# Patient Record
Sex: FEMALE | Race: WHITE
[De-identification: names, ages, dates, MRNs, and addresses within clinical notes are randomized per-mention and may not be internally consistent; named-entity substitution may affect disease eponyms.]

---

## 2017-12-04 ENCOUNTER — HOSPITAL ENCOUNTER (OUTPATIENT)
Dept: HOSPITAL 45 - C.MTU | Age: 37
Discharge: HOME | End: 2017-12-04
Attending: FAMILY MEDICINE
Payer: COMMERCIAL

## 2017-12-04 VITALS
WEIGHT: 130.07 LBS | BODY MASS INDEX: 22.21 KG/M2 | BODY MASS INDEX: 22.21 KG/M2 | HEIGHT: 64.02 IN | HEIGHT: 64.02 IN | WEIGHT: 130.07 LBS

## 2017-12-04 VITALS
TEMPERATURE: 98.78 F | DIASTOLIC BLOOD PRESSURE: 86 MMHG | HEART RATE: 70 BPM | OXYGEN SATURATION: 98 % | SYSTOLIC BLOOD PRESSURE: 122 MMHG

## 2017-12-04 DIAGNOSIS — M32.9: Primary | ICD-10-CM

## 2017-12-04 DIAGNOSIS — R68.89: ICD-10-CM

## 2017-12-04 LAB
ALBUMIN/GLOB SERPL: 0.9 {RATIO} (ref 0.9–2)
ALP SERPL-CCNC: 96 U/L (ref 45–117)
ALT SERPL-CCNC: 40 U/L (ref 12–78)
ANION GAP SERPL CALC-SCNC: 9 MMOL/L (ref 3–11)
APPEARANCE UR: CLEAR
AST SERPL-CCNC: (no result) U/L (ref 15–37)
AST SERPL-CCNC: 19 U/L (ref 15–37)
BILIRUB UR-MCNC: (no result) MG/DL
BUN SERPL-MCNC: 12 MG/DL (ref 7–18)
BUN/CREAT SERPL: 12.9 (ref 10–20)
CALCIUM SERPL-MCNC: 8.5 MG/DL (ref 8.5–10.1)
CHLORIDE SERPL-SCNC: 105 MMOL/L (ref 98–107)
CO2 SERPL-SCNC: 24 MMOL/L (ref 21–32)
COLOR UR: YELLOW
CREAT CL PREDICTED SERPL C-G-VRATE: 73.8 ML/MIN
CREAT SERPL-MCNC: 0.91 MG/DL (ref 0.6–1.2)
EOSINOPHIL NFR BLD AUTO: 120 K/UL (ref 130–400)
GLOBULIN SER-MCNC: 4.5 GM/DL (ref 2.5–4)
GLUCOSE SERPL-MCNC: 79 MG/DL (ref 70–99)
HCT VFR BLD CALC: 42.9 % (ref 37–47)
MANUAL MICROSCOPIC REQUIRED?: NO
MCH RBC QN AUTO: 29.6 PG (ref 25–34)
MCHC RBC AUTO-ENTMCNC: 33.3 G/DL (ref 32–36)
MCV RBC AUTO: 88.8 FL (ref 80–100)
NITRITE UR QL STRIP: (no result)
PH UR STRIP: 6.5 [PH] (ref 4.5–7.5)
PMV BLD AUTO: 10.2 FL (ref 7.4–10.4)
POTASSIUM SERPL-SCNC: (no result) MMOL/L (ref 3.5–5.1)
POTASSIUM SERPL-SCNC: 3.6 MMOL/L (ref 3.5–5.1)
RBC # BLD AUTO: 4.83 M/UL (ref 4.2–5.4)
REVIEW REQ?: YES
SODIUM SERPL-SCNC: 137 MMOL/L (ref 136–145)
SP GR UR STRIP: 1.01 (ref 1–1.03)
URINE EPITHELIAL CELL AUTO: >30 /LPF (ref 0–5)
UROBILINOGEN UR-MCNC: (no result) MG/DL
WBC # BLD AUTO: 4.03 K/UL (ref 4.8–10.8)

## 2017-12-04 RX ADMIN — SODIUM CHLORIDE SCH MLS/HR: 900 INJECTION, SOLUTION INTRAVENOUS at 14:05

## 2018-01-10 ENCOUNTER — HOSPITAL ENCOUNTER (OUTPATIENT)
Dept: HOSPITAL 45 - C.MED | Age: 38
Setting detail: OBSERVATION
LOS: 1 days | Discharge: HOME | End: 2018-01-11
Attending: FAMILY MEDICINE | Admitting: HOSPITALIST
Payer: COMMERCIAL

## 2018-01-10 VITALS
HEART RATE: 79 BPM | DIASTOLIC BLOOD PRESSURE: 75 MMHG | SYSTOLIC BLOOD PRESSURE: 115 MMHG | OXYGEN SATURATION: 96 % | TEMPERATURE: 98.42 F

## 2018-01-10 VITALS
OXYGEN SATURATION: 97 % | HEART RATE: 69 BPM | SYSTOLIC BLOOD PRESSURE: 137 MMHG | TEMPERATURE: 97.7 F | DIASTOLIC BLOOD PRESSURE: 85 MMHG

## 2018-01-10 VITALS — OXYGEN SATURATION: 98 %

## 2018-01-10 VITALS
WEIGHT: 131.18 LBS | WEIGHT: 131.18 LBS | HEIGHT: 64 IN | BODY MASS INDEX: 22.39 KG/M2 | BODY MASS INDEX: 22.39 KG/M2 | HEIGHT: 64 IN

## 2018-01-10 VITALS
DIASTOLIC BLOOD PRESSURE: 80 MMHG | OXYGEN SATURATION: 98 % | SYSTOLIC BLOOD PRESSURE: 121 MMHG | TEMPERATURE: 98.78 F | HEART RATE: 87 BPM

## 2018-01-10 DIAGNOSIS — I73.00: ICD-10-CM

## 2018-01-10 DIAGNOSIS — G40.909: ICD-10-CM

## 2018-01-10 DIAGNOSIS — F33.9: ICD-10-CM

## 2018-01-10 DIAGNOSIS — F17.210: ICD-10-CM

## 2018-01-10 DIAGNOSIS — M32.14: Primary | ICD-10-CM

## 2018-01-10 DIAGNOSIS — N80.9: ICD-10-CM

## 2018-01-10 DIAGNOSIS — L70.9: ICD-10-CM

## 2018-01-10 DIAGNOSIS — Z79.899: ICD-10-CM

## 2018-01-10 DIAGNOSIS — G43.909: ICD-10-CM

## 2018-01-10 LAB
BASOPHILS # BLD: 0.03 K/UL (ref 0–0.2)
BASOPHILS NFR BLD: 0.4 %
BUN SERPL-MCNC: 17 MG/DL (ref 7–18)
CALCIUM SERPL-MCNC: 8.7 MG/DL (ref 8.5–10.1)
CO2 SERPL-SCNC: 30 MMOL/L (ref 21–32)
CREAT SERPL-MCNC: 0.81 MG/DL (ref 0.6–1.2)
EOS ABS #: 0.03 K/UL (ref 0–0.5)
EOSINOPHIL NFR BLD AUTO: 207 K/UL (ref 130–400)
GLUCOSE SERPL-MCNC: 88 MG/DL (ref 70–99)
HCT VFR BLD CALC: 35.6 % (ref 37–47)
HGB BLD-MCNC: 12 G/DL (ref 12–16)
IG#: 0.15 K/UL (ref 0–0.02)
IMM GRANULOCYTES NFR BLD AUTO: 20.1 %
INR PPP: 1 (ref 0.9–1.1)
LYMPHOCYTES # BLD: 1.47 K/UL (ref 1.2–3.4)
MCH RBC QN AUTO: 31.1 PG (ref 25–34)
MCHC RBC AUTO-ENTMCNC: 33.7 G/DL (ref 32–36)
MCV RBC AUTO: 92.2 FL (ref 80–100)
MONO ABS #: 0.63 K/UL (ref 0.11–0.59)
MONOCYTES NFR BLD: 8.6 %
NEUT ABS #: 5.02 K/UL (ref 1.4–6.5)
NEUTROPHILS # BLD AUTO: 0.4 %
NEUTROPHILS NFR BLD AUTO: 68.5 %
PMV BLD AUTO: 8.5 FL (ref 7.4–10.4)
POTASSIUM SERPL-SCNC: 3.7 MMOL/L (ref 3.5–5.1)
PTT PATIENT: 22.8 SECONDS (ref 21–31)
RED CELL DISTRIBUTION WIDTH CV: 14.5 % (ref 11.5–14.5)
RED CELL DISTRIBUTION WIDTH SD: 49.3 FL (ref 36.4–46.3)
SODIUM SERPL-SCNC: 140 MMOL/L (ref 136–145)
WBC # BLD AUTO: 7.33 K/UL (ref 4.8–10.8)

## 2018-01-10 RX ADMIN — CEPHALEXIN SCH MG: 500 CAPSULE ORAL at 20:00

## 2018-01-10 RX ADMIN — LORAZEPAM PRN MG: 0.5 TABLET ORAL at 23:02

## 2018-01-10 NOTE — HISTORY AND PHYSICAL
History & Physical


Date & Time of Service:


Андрей 10, 2018 at 14:04


Chief Complaint:


Lupus Flare


Primary Care Physician:


Bhargavi Mallory D.O.


History of Present Illness


38 y/o F with h/o SLE presents as direct admit from PCP office for management 

of exacerbation of lupus failing outpatient treatment. During her routine labs 

follow up labs for lupus about a month ago she was noted to have proteinuria 

with 1gm of protein in the urine. She also reported increase in the baseline 

fatigue. She was put on oral prednisone. While on treatment she also developed 

severe acne and seeked treatment due to lymph node swelling in the neck as a 

results of severe acne and was prescribed keflex a week ago. After starting 

keflex she noted improvement in acne and fatigue. She was called by Dr. Davis's 

office today for having worsening proteinuria of 6gms on her repeat labs and so 

is being admitted for aggressive management.





Past Medical/Surgical History


Medical Problems:


(1) Acne


Status: Chronic  





(2) Endometriosis


Status: Chronic  





(3) Hypermobility of joint


Status: Chronic  





(4) Major depressive disorder, recurrent episode with anxious distress


Status: Chronic  





(5) Migraine


Status: Chronic  





(6) Seizure


Status: Chronic  





(7) Systemic lupus erythematosus (SLE) in adult


Status: Chronic  





Surgical Problems:


(1) H/O laparoscopy


Status: Resolved  





(2) S/P PICC central line placement


Status: Resolved  





(3) [place] teeth removed


Status: Resolved  








Family History





Patient reports no known family medical history.


CAD


Diabetes


Epilepsy


GI bleed


HTN


Lupus


Stroke





Social History


Smoking Status:  Light Tobacco Smoker (smoked for 15 yrs. Has smoked 1ppd until 

recent past)


Drug Use:  none


Marital Status:  single


Housing status:  lives with family


Occupational Status:  unemployed





Multi-Drug Resistant Organisms


History of MDRO:  No





Allergies


Coded Allergies:  


     Filgrastim (Unverified  Allergy, Intermediate, RASH, 12/4/17)


     BEE STING (Verified  Allergy, Unknown, swelling, 12/4/17)


     Latex1 -Allergic Contact Dermititis (Verified  Allergy, Unknown, rash, 1/10

/18)





Home Medications


Scheduled


Levonorgestrel (Iud) (Mirena), 20 MCG INT UTER UD


Prednisone (Prednisone), 2 TAB PO DAILY


[Cephalex ], 1,000 MG OR DAILY


[oxycontin], 5 MG PO DAILY





Scheduled PRN


Lorazepam (Ativan), 0.5 MG PO TID PRN for Anxiety/Agitation





Review of Systems


Constitutional:  + fatigue, No fever


Eyes:  No worsening of vision


ENT:  No hearing loss


Respiratory:  No cough, No sputum, No wheezing


Cardiovascular:  No chest pain


Abdomen:  No pain, No nausea, No vomiting


Musculoskeletal:  + joint pain (chronic joint pain in all joint especially in 

hand joints)


Genitourinary - Female:  No dysuria


Neurologic:  No memory loss, No paralysis, No weakness, No numbness/tingling


Psychiatric:  + depression symptoms, + anxiety


Endocrine:  + fatigue


Hematologic / Lymphatic:  No abnormal bleeding/bruising


Integumentary:  + rash (acne)


Allergic / Immunologic:  No seasonal allergies





Physical Exam


Vital Signs











  Date Time  Temp Pulse Resp B/P (MAP) Pulse Ox O2 Delivery O2 Flow Rate FiO2


 


1/10/18 13:11 37.1 87 16 121/80 98 Room Air  








General Appearance:  no apparent distress


Head:  normocephalic, atraumatic


Eyes:  normal inspection


ENT:  hearing grossly normal, TMs normal, pharynx normal, + pertinent finding (

tongue coated)


Neck:  supple, thyroid normal, + pertinent finding (right soft cervical 

adenopathy)


Respiratory/Chest:  lungs clear, no respiratory distress


Cardiovascular:  regular rate, rhythm, no edema, no gallop, no JVD, no murmur, 

normal peripheral pulses


Abdomen/GI:  normal bowel sounds, non tender, soft


Back:  normal inspection


Extremities/Musculoskelatal:  normal inspection, no calf tenderness, normal 

capillary refill, no pedal edema


Neurologic/Psych:  no motor/sensory deficits, alert, normal mood/affect, normal 

reflexes, oriented x 3


Skin:  + pertinent finding (extensive facial acne)





Diagnostics


Laboratory Results


Pending





Impression


Assessment and Plan


38 y/o F with h/o Lupus here with exacerbation of SLE manifesting as worsening 

proteinuria





SLE flare failed outpatient treatment 


- spoke to Dr. Cage. switch prednisone to 1gm solumedrol daily x 3 days. PPI 

for GI prophylaxis


- consult rheumatology 


- cbc, bmp, pt/inr


- continue prn oxycodone for joint pain





Depression/Anxiety


- Was recently put on prozac and had severe headache so stopped it. 


- take prn lorazepam - once or twice a month. 


- Since going on high dose steroid - closely monitor. 





Acne 


- continue keflex





? h/o Seizure


- Not on medication. Follow. 





Encourage ambulation


Full code. 


PPI for GI proph considering steroid dose





Advanced Directives


Existing Living Will:  No


Existing Power of :  No





VTE Prophylaxis


VTE Risk Assessment Done? Y/N:  Yes


Risk Level:  Low

## 2018-01-11 VITALS
HEART RATE: 85 BPM | DIASTOLIC BLOOD PRESSURE: 78 MMHG | OXYGEN SATURATION: 93 % | TEMPERATURE: 98.96 F | SYSTOLIC BLOOD PRESSURE: 125 MMHG

## 2018-01-11 VITALS
OXYGEN SATURATION: 98 % | TEMPERATURE: 98.6 F | SYSTOLIC BLOOD PRESSURE: 129 MMHG | HEART RATE: 63 BPM | DIASTOLIC BLOOD PRESSURE: 83 MMHG

## 2018-01-11 VITALS
DIASTOLIC BLOOD PRESSURE: 83 MMHG | TEMPERATURE: 98.6 F | HEART RATE: 63 BPM | OXYGEN SATURATION: 98 % | SYSTOLIC BLOOD PRESSURE: 129 MMHG

## 2018-01-11 VITALS
TEMPERATURE: 98.6 F | OXYGEN SATURATION: 98 % | DIASTOLIC BLOOD PRESSURE: 77 MMHG | SYSTOLIC BLOOD PRESSURE: 115 MMHG | HEART RATE: 67 BPM

## 2018-01-11 RX ADMIN — CEPHALEXIN SCH MG: 500 CAPSULE ORAL at 08:17

## 2018-01-11 RX ADMIN — LORAZEPAM PRN MG: 0.5 TABLET ORAL at 10:37

## 2018-01-11 NOTE — RHEUMATOLOGY CONSULTATION
Rheumatology Consultation


Date of Consultation:


2018.


Requesting Physician:


Dr Lizama


Attending Physician:


Dr Lizama


Reason for Consultation:


SLE nephritis flare


History of Present Illness


Brandon is a 37-year-old female with known systemic lupus since  with 

previous flares that have responded favorably to steroids.  The patient is 

known to me from previous hospitalization  for lupus nephritis flare felt 

to be brought on by Bactrim.  At that time her anti-DNA was greater than 3000 

in a discussion of a kidney biopsy was done but at that point decided to just 

treat with steroids at the patient 's request and no biopsy was performed.  She 

has not been seen by our Rheumatology Department since 2016. Her 

original diagnosis was in [place] around  where she initially 

presented with rash, fatigue and inflammatory arthritis.  She also had a 

hospitalization several years later that was pretty significant that included 

inflammatory arthritis, hepatitis and neutropenia.  In the past she has been 

treated with Plaquenil but did not tolerate it from headaches and diarrhea.  

She reports she took for maybe 2 years.  She has also received methotrexate in 

the past.  Unfortunately even though she signed request for old records her 

[place] records have never been sent to her doctors in the [facility].  Since  she is mainly treated her systemic lupus flares with 

steroids.  Her flares of consisted of arthralgias, fatigue and has had lupus 

nephritis flares as well.  She reports that she did see a rheumatologist and 

[place] at [facility] in the last year but could no longer go cause a insurance 

reasons.  At that time her lab work looks good and she was not placed on any 

kind of therapy.  She does not recall how long she has been off prednisone 

since her last visit with me but probably was about a year.  She reports that 

started around [place] she started feeling ill but thought was related to 

the flu because multiple family members were sick but she just did not get 

better.  Her symptoms were mainly arthralgias myalgias and fatigue.  She 

followed with her PCP and with lab work was noted to have some protein in her 

urine.  She saw Dr Cage in mid December (Dr Cage had followed her past well) 

and he noted on studies that she had about 800 milligrams of protein but other 

labs look okay.  She had been placed on 40 milligrams of prednisone with a good 

response with start to feel better.  Lab work done recently though showed 5.6 

grams of protein in her urine but her GFR was normal.  She had a slightly low 

C3 but normal C4 in her anti DNA was 20. She was admitted to the hospital for 

high dose IV steroids for lupus nephritis flare.  She received her 1st dose 

yesterday and will be getting her 2nd dose today.  She reports she is feeling 

much better.  She also notes during the time over the last several weeks she 

developed a facial rash that similar to what she has had in the past.  It 

almost seems like acne weather can be some pustules both also areas the feel 

cystic.  She was placed on Keflex for secondary skin infection as well since 

she was on high-dose steroids.  She also noted some right-sided lymphadenopathy 

that led to the antibiotic use.  She feels the rash is getting better.  She is 

not really having any musculokeletal complaints today.  She is hoping to go 

home today or tomorrow after her IV steroid courses are complete.  Dr. Cage's 

note was reviewed.  She did ask me about the need for a biopsy could she is 

considering this at this point could she does not want to be on high-dose 

steroids for long periods of time.  She mentions that a close friend of hers 

mother  from complications of systemic lupus and chronic steroid use and 

she does not want that to be her.  She reports that she has dealt with some 

depression as well and recently was put on Paxil to help with this but that 

caused severe migraines for her that got better when she was off the 

antidepressant.  She has never received Benlysta, CellCept, Cytoxan, rituximab 

or Imuran.  She also reports Raynaud 's.





Past Medical/Surgical History


Medical History:  depression, other (Lupus nephritis, systemic lupus, Raynaud 's

)





Family History


Noncontributory





Social History


Smoking Status:  Light Tobacco Smoker (smoked for 15 yrs. Has smoked 1ppd until 

recent past)


History of Alcohol Use:  No


Drug Use:  none


Marital Status:  single


Housing Status:  lives with family


Occupation Status:  unemployed





Review of Systems


Constitutional:  + fatigue


ENT:  + problem reported (Lymphadenopathy)


Respiratory:  No wheezing, No shortness of breath


Cardiac:  No chest pain, No edema


Abdomen:  No pain, No nausea


Musculoskeletal:  + see HPI


Neurologic:  + see HPI


Psychiatric:  + depression symptoms


Skin:  + rash


All Other Systems:  Reviewed and Negative





Allergies


Coded Allergies:  


     Filgrastim (Unverified  Allergy, Intermediate, RASH, 17)


     BEE STING (Verified  Allergy, Unknown, swelling, 17)


     Latex1 -Allergic Contact Dermititis (Verified  Allergy, Unknown, rash, 1/10

/18)





Medications





Current Inpatient Medications








 Medications


  (Trade)  Dose


 Ordered  Sig/Jody


 Route  Start Time


 Stop Time Status Last Admin


Dose Admin


 


 Enoxaparin Sodium


  (Lovenox Inj)  40 mg  Q24H


 SQ  1/10/18 21:00


 18 20:59   


 


 


 Ondansetron HCl


  (Zofran Inj)  4 mg  Q6H  PRN


 IV  1/10/18 13:45


 18 13:44   


 


 


 Zolpidem Tartrate


  (Ambien Tab)  5 mg  HSZ  PRN


 PO  1/10/18 13:45


 18 13:44   


 


 


 Methylprednisolone


 Sodium Succinate


 1000 mg/Dextrose  266 ml @ 


 250 mls/hr  DAILY


 IV  18 09:00


 18 10:04   


 


 


 Miscellaneous


  (Iv Fluids


 Completed)  1 ea  PRN  PRN


 N/A  1/10/18 14:00


 1/10/19 13:59   


 


 


 Pantoprazole


 Sodium


  (Protonix Tab)  40 mg  QAM


 PO  18 09:00


 1/15/18 08:59   


 


 


 Lorazepam


  (Ativan Tab)  0.5 mg  TID  PRN


 PO  1/10/18 15:15


 18 15:14  1/10/18 23:02


0.5 MG


 


 Cephalexin


 Monohydrate


  (Keflex Cap)  500 mg  BID


 PO  1/10/18 21:00


 18 20:59  1/10/18 20:00


500 MG


 


 Oxycodone HCl


  (Roxicodone


 Immediate Rel Tab)  5 mg  DAILY  PRN


 PO  1/10/18 15:15


 18 15:14   


 











Physical Exam











  Date Time  Temp Pulse Resp B/P (MAP) Pulse Ox O2 Delivery O2 Flow Rate FiO2


 


18 07:28 37.0 63 20 129/83 (98) 98 Room Air  


 


18 04:00      Room Air  


 


18 04:00 37.0 67 20 115/77 (90) 98 Room Air  


 


18 00:00 37.2 85 20 125/78 (94) 93 Room Air  


 


18 00:00      Room Air  


 


1/10/18 20:29 36.5 69 18 137/85 (102) 97 Room Air  


 


1/10/18 20:00      Room Air  


 


1/10/18 15:57 36.9 79 18 115/75 (88) 96 Room Air  


 


1/10/18 15:07     98 Room Air  


 


1/10/18 13:11 37.1 87 16 121/80 98 Room Air  








General Appearance:  WD/WN, no apparent distress


Eyes:  bilateral eyes normal inspection, bilateral eyes EOMI


ENT:  normal ENT inspection, hearing grossly normal, pharynx normal


Neck:  supple, trachea midline, + adenopathy present (Right-sided anterior 

cervical adenopathy noted that was tender)


Respiratory:  chest non-tender, lungs clear, normal breath sounds, no 

respiratory distress


Cardiovascular:  regular rate, rhythm, no edema, no gallop, no murmur


Abdomen:  normal bowel sounds, non tender, soft


Musculoskeletal:


No synovitis of the hands noted


Neurologic/Psychiatric:  alert, normal mood/affect, oriented x 3


Skin:  + rash (Likely subcutaneous lupus rash on the face with some ulceration 

and scabs noted.)


Lymphatic:  + pertinent finding (Right anterior cervical adenopathy)





Laboratory Results





Last 24 Hours








Test


  1/10/18


14:07


 


White Blood Count 7.33 K/uL 


 


Red Blood Count 3.86 M/uL 


 


Hemoglobin 12.0 g/dL 


 


Hematocrit 35.6 % 


 


Mean Corpuscular Volume 92.2 fL 


 


Mean Corpuscular Hemoglobin 31.1 pg 


 


Mean Corpuscular Hemoglobin


Concent 33.7 g/dl 


 


 


Platelet Count 207 K/uL 


 


Mean Platelet Volume 8.5 fL 


 


Neutrophils (%) (Auto) 68.5 % 


 


Lymphocytes (%) (Auto) 20.1 % 


 


Monocytes (%) (Auto) 8.6 % 


 


Eosinophils (%) (Auto) 0.4 % 


 


Basophils (%) (Auto) 0.4 % 


 


Neutrophils # (Auto) 5.02 K/uL 


 


Lymphocytes # (Auto) 1.47 K/uL 


 


Monocytes # (Auto) 0.63 K/uL 


 


Eosinophils # (Auto) 0.03 K/uL 


 


Basophils # (Auto) 0.03 K/uL 


 


RDW Standard Deviation 49.3 fL 


 


RDW Coefficient of Variation 14.5 % 


 


Immature Granulocyte % (Auto) 2.0 % 


 


Immature Granulocyte # (Auto) 0.15 K/uL 


 


Prothrombin Time 10.0 SECONDS 


 


Prothromb Time International


Ratio 1.0 


 


 


Activated Partial


Thromboplast Time 22.8 SECONDS 


 


 


Partial Thromboplastin Ratio 0.9 


 


Sodium Level 140 mmol/L 


 


Potassium Level 3.7 mmol/L 


 


Chloride Level 106 mmol/L 


 


Carbon Dioxide Level 30 mmol/L 


 


Anion Gap 4.0 mmol/L 


 


Blood Urea Nitrogen 17 mg/dl 


 


Creatinine 0.81 mg/dl 


 


Est Creatinine Clear Calc


Drug Dose 83.0 ml/min 


 


 


Estimated GFR (


American) 108.3 


 


 


Estimated GFR (Non-


American 93.4 


 


 


BUN/Creatinine Ratio 21.0 


 


Random Glucose 88 mg/dl 


 


Calcium Level 8.7 mg/dl 


 


Chemistry Specimen Hemolysis  











Assessment & Plan


Assessment & Plan:


Assessment: Brandon is a 37-year-old female with nephrotic range proteinuria 

secondary to lupus nephritis.  She is currently on IV Solu-Medrol 1 gram for 3 

days in a row and then will transition back to high dose steroids.  She is 

feeling better overall and also is having improvement in her systemic lupus 

rash of her face.  Did spend some time discussing the possibility of a kidney 

biopsy as an outpatient to help guide therapy and she is going to consider this 

since she does not necessarily 1 of remain on high-dose steroids and chronic 

steroid use for long periods of time.  This will be discussed as an outpatient 

with her nephrologist Dr. Cage.  I did briefly discuss some possible 

immunosuppressive therapies for her systemic lupus including CellCept and 

rituximab for her nephritis.  Would not resume Plaquenil in her case given her 

intolerance in the past.  She is going to do some research on the medications 

we discussed and discuss further with her nephrologist and us as an outpatient.

  She seems to favor rituximab over CellCept (she had not wanted to go on 

CellCept or Cytoxan in the past). Case was discussed with Dr. Cage this morning 

at the hospital.





Plan:


1.  Agree with IV Solu-Medrol for 3 days


2.  Transition to oral high-dose steroids as per Nephrology


3.  Will arrange outpatient Rheumatology follow-up in the upcoming weeks


4.  To discuss renal biopsy to help guide therapy as an outpatient as well


5.  Thank you for the consult in involving me in this patient 's care

## 2018-01-11 NOTE — DISCHARGE SUMMARY
Discharge Summary


Date of Service


Jan 11, 2018.





Discharge Summary


Admission Date:


Андрей 10, 2018 at 12:26


Discharge Date:  Jan 11, 2018


Discharge Disposition:  Home


Principal Diagnosis:  Severe Proteinuria, nephrotic range, Lupus nephritis


Problems/Secondary Diagnoses:


SLE


Consultations:


Nephrology - Dr. Cage


Rheumatology - Dr. Dominguez





Medication Reconciliation


New Medications:  


Prednisone (Prednisone) 10 Mg Tab


60 MG PO DAILY for 15 Days, #90 TAB





 


Continued Medications:  


Levonorgestrel (Iud) (Mirena) 20 Mcg/24 Hr Iud


20 MCG INT UTER UD





Lorazepam (Ativan) 0.5 Mg Tab


0.5 MG PO TID PRN for Anxiety/Agitation, #1 TAB





[Cephalex ] ()  


1000 MG OR DAILY





[oxycontin] ()  


5 MG PO DAILY





 


Discontinued Medications:  


Prednisone (Prednisone) 20 Mg Tab


2 TAB PO DAILY, #5 TAB











Discharge Exam





Last 24 Hours








Test


  1/10/18


14:07


 


White Blood Count 7.33 K/uL 


 


Red Blood Count 3.86 M/uL 


 


Hemoglobin 12.0 g/dL 


 


Hematocrit 35.6 % 


 


Mean Corpuscular Volume 92.2 fL 


 


Mean Corpuscular Hemoglobin 31.1 pg 


 


Mean Corpuscular Hemoglobin


Concent 33.7 g/dl 


 


 


Platelet Count 207 K/uL 


 


Mean Platelet Volume 8.5 fL 


 


Neutrophils (%) (Auto) 68.5 % 


 


Lymphocytes (%) (Auto) 20.1 % 


 


Monocytes (%) (Auto) 8.6 % 


 


Eosinophils (%) (Auto) 0.4 % 


 


Basophils (%) (Auto) 0.4 % 


 


Neutrophils # (Auto) 5.02 K/uL 


 


Lymphocytes # (Auto) 1.47 K/uL 


 


Monocytes # (Auto) 0.63 K/uL 


 


Eosinophils # (Auto) 0.03 K/uL 


 


Basophils # (Auto) 0.03 K/uL 


 


RDW Standard Deviation 49.3 fL 


 


RDW Coefficient of Variation 14.5 % 


 


Immature Granulocyte % (Auto) 2.0 % 


 


Immature Granulocyte # (Auto) 0.15 K/uL 


 


Prothrombin Time 10.0 SECONDS 


 


Prothromb Time International


Ratio 1.0 


 


 


Activated Partial


Thromboplast Time 22.8 SECONDS 


 


 


Partial Thromboplastin Ratio 0.9 


 


Sodium Level 140 mmol/L 


 


Potassium Level 3.7 mmol/L 


 


Chloride Level 106 mmol/L 


 


Carbon Dioxide Level 30 mmol/L 


 


Anion Gap 4.0 mmol/L 


 


Blood Urea Nitrogen 17 mg/dl 


 


Creatinine 0.81 mg/dl 


 


Est Creatinine Clear Calc


Drug Dose 83.0 ml/min 


 


 


Estimated GFR (


American) 108.3 


 


 


Estimated GFR (Non-


American 93.4 


 


 


BUN/Creatinine Ratio 21.0 


 


Random Glucose 88 mg/dl 


 


Calcium Level 8.7 mg/dl 


 


Chemistry Specimen Hemolysis  








Review of Systems:  


   Constitutional:  No fever


   Respiratory:  No shortness of breath


   Cardiovascular:  No chest pain


   Abdomen:  No pain


   Genitourinary - Female:  No dysuria


Physical Exam:  


   General Appearance:  no apparent distress


   Respiratory/Chest:  lungs clear, no respiratory distress


   Cardiovascular:  regular rate, rhythm


   Abdomen / GI:  normal bowel sounds, non tender, soft


   Neurologic/Psychiatric:  alert, oriented x 3


   Skin:  warm/dry





Hospital Course


36 y/o F with h/o SLE here with severe proteinuria - nephrotic range secondary 

to lupus nephritis 





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 

membranous glomerulonephritis)


- Received 2 of 3 doses of IV 1gm solumedrol as per Dr. Cage recommendation. 

Third dose to be given at [facility] tomorrow - arranged by care manager. After that - 

to resume prednisone at 60mgs daily. 


- consulted rheumatology. Agreed with plan. 


- to follow up with nephrology in a week. if no improvement - considering renal 

biopsy and immunosuppressants.





Depression/Anxiety


- Was recently put on prozac and had severe headache so stopped it. 


- take prn lorazepam. 


- follow up with pcp for other daily med options. 





Acne 


- continue keflex





? h/o Seizure


- Not on medication. Outpatient follow up on old records


Total Time Spent:  Greater than 30 minutes


This includes examination of the patient, discharge planning, medication 

reconciliation, and communication with other providers.





Discharge Instructions


Please refer to the electronic Patient Visit Report (Discharge Instructions) 

for additional information.





Additional Copies To


Bhargavi Mallory D.O.

## 2018-01-11 NOTE — MEDICAL STUDENT: [facility]
Med Student Progress Note


Date of Service


Jan 11, 2018.





Subjective


Pt evaluation today including:  conversation w/ patient, physical exam, chart 

review, lab review, review of studies


Pt is a 36 yo F direct admission after failing outpatient treatment for her 

flare up of SLE. Her hx notable for SLE diagnosed at 2002 when she presented 

with fatigue, rash, and joint pain, anxiety, depression, acne,endometriosis, 

joint hypermobility, migraine, and possible seizure. She was recently on 40mg 

prednisone as outpatient after lab finding of 1g protein in her urine in 

December. Repeat testing done recently showed an increase to 6g.





Pt is feeling well this morning but slept only 2 hours even with Ativan. She 

doesn't usually have sleep issues and this seems to be related to the 

surrounding environment. Complains of joint pain but not different from 

baseline.





Review of Systems


Constitutional:  No fever, No chills


Eyes:  No worsening of vision


ENT:  No hearing loss


Respiratory:  No cough, No sputum


Cardiac:  No chest pain, No orthopnea, No PND


Abdomen:  No pain, No nausea, No vomiting


Musculoskeletal:  + joint pain


Female :  No dysuria, No hematuria


Neurologic:  No memory loss, No weakness


Psychiatric:  + depression symptoms, + anxiety


Heme:  No abnormal bleeding/bruising


Endo:  No fatigue


Skin:  + problem reported (sevee acne on face)





Objective


Vital Signs











  Date Time  Temp Pulse Resp B/P (MAP) Pulse Ox O2 Delivery O2 Flow Rate FiO2


 


1/11/18 10:43 37.0 63 20  98 Room Air  


 


1/11/18 08:00      Room Air  


 


1/11/18 07:28 37.0 63 20 129/83 (98) 98 Room Air  


 


1/11/18 04:00      Room Air  


 


1/11/18 04:00 37.0 67 20 115/77 (90) 98 Room Air  


 


1/11/18 00:00 37.2 85 20 125/78 (94) 93 Room Air  


 


1/11/18 00:00      Room Air  


 


1/10/18 20:29 36.5 69 18 137/85 (102) 97 Room Air  


 


1/10/18 20:00      Room Air  


 


1/10/18 15:57 36.9 79 18 115/75 (88) 96 Room Air  


 


1/10/18 15:07     98 Room Air  


 


1/10/18 13:11 37.1 87 16 121/80 98 Room Air  











Physical Exam


General Appearance:  WD/WN, no apparent distress


Eyes:  bilateral eyes normal inspection, bilateral eyes PERRL, bilateral eyes 

EOMI


ENT:  normal ENT inspection, hearing grossly normal


Neck:  supple, thyroid normal, no JVD, + adenopathy present (right anterior 

cervical lymphadenopathy)


Respiratory/Chest:  chest non-tender, lungs clear, normal breath sounds, no 

respiratory distress


Cardiovascular:  regular rate, rhythm, no edema, no gallop, no JVD, no murmur


Abdomen:  normal bowel sounds, non tender, soft


Extremities:  normal range of motion, non-tender, normal inspection, no pedal 

edema, no calf tenderness


Neurologic/Psychiatric:  CNs II-XII nml as tested, no motor/sensory deficits, 

normal mood/affect, oriented x 3


Skin:  normal color, + pertinent finding (severe facial acne)


Lymphatic:  + pertinent finding (right cervial lymphadenopathy)





Laboratory Results





Last 24 Hours








Test


  1/10/18


14:07


 


White Blood Count 7.33 K/uL 


 


Red Blood Count 3.86 M/uL 


 


Hemoglobin 12.0 g/dL 


 


Hematocrit 35.6 % 


 


Mean Corpuscular Volume 92.2 fL 


 


Mean Corpuscular Hemoglobin 31.1 pg 


 


Mean Corpuscular Hemoglobin


Concent 33.7 g/dl 


 


 


Platelet Count 207 K/uL 


 


Mean Platelet Volume 8.5 fL 


 


Neutrophils (%) (Auto) 68.5 % 


 


Lymphocytes (%) (Auto) 20.1 % 


 


Monocytes (%) (Auto) 8.6 % 


 


Eosinophils (%) (Auto) 0.4 % 


 


Basophils (%) (Auto) 0.4 % 


 


Neutrophils # (Auto) 5.02 K/uL 


 


Lymphocytes # (Auto) 1.47 K/uL 


 


Monocytes # (Auto) 0.63 K/uL 


 


Eosinophils # (Auto) 0.03 K/uL 


 


Basophils # (Auto) 0.03 K/uL 


 


RDW Standard Deviation 49.3 fL 


 


RDW Coefficient of Variation 14.5 % 


 


Immature Granulocyte % (Auto) 2.0 % 


 


Immature Granulocyte # (Auto) 0.15 K/uL 


 


Prothrombin Time 10.0 SECONDS 


 


Prothromb Time International


Ratio 1.0 


 


 


Activated Partial


Thromboplast Time 22.8 SECONDS 


 


 


Partial Thromboplastin Ratio 0.9 


 


Sodium Level 140 mmol/L 


 


Potassium Level 3.7 mmol/L 


 


Chloride Level 106 mmol/L 


 


Carbon Dioxide Level 30 mmol/L 


 


Anion Gap 4.0 mmol/L 


 


Blood Urea Nitrogen 17 mg/dl 


 


Creatinine 0.81 mg/dl 


 


Est Creatinine Clear Calc


Drug Dose 83.0 ml/min 


 


 


Estimated GFR (


American) 108.3 


 


 


Estimated GFR (Non-


American 93.4 


 


 


BUN/Creatinine Ratio 21.0 


 


Random Glucose 88 mg/dl 


 


Calcium Level 8.7 mg/dl 


 


Chemistry Specimen Hemolysis  











Medications





Current Inpatient Medications








 Medications


  (Trade)  Dose


 Ordered  Sig/Jody


 Route  Start Time


 Stop Time Status Last Admin


Dose Admin


 


 Enoxaparin Sodium


  (Lovenox Inj)  40 mg  Q24H


 SQ  1/10/18 21:00


 2/9/18 20:59   


 


 


 Ondansetron HCl


  (Zofran Inj)  4 mg  Q6H  PRN


 IV  1/10/18 13:45


 2/9/18 13:44   


 


 


 Zolpidem Tartrate


  (Ambien Tab)  5 mg  HSZ  PRN


 PO  1/10/18 13:45


 2/9/18 13:44   


 


 


 Methylprednisolone


 Sodium Succinate


 1000 mg/Dextrose  266 ml @ 


 250 mls/hr  DAILY


 IV  1/11/18 09:00


 1/12/18 10:04  1/11/18 08:17


250 MLS/HR


 


 Miscellaneous


  (Iv Fluids


 Completed)  1 ea  PRN  PRN


 N/A  1/10/18 14:00


 1/10/19 13:59   


 


 


 Pantoprazole


 Sodium


  (Protonix Tab)  40 mg  QAM


 PO  1/11/18 09:00


 1/15/18 08:59  1/11/18 08:17


40 MG


 


 Lorazepam


  (Ativan Tab)  0.5 mg  TID  PRN


 PO  1/10/18 15:15


 2/9/18 15:14  1/11/18 10:37


0.5 MG


 


 Cephalexin


 Monohydrate


  (Keflex Cap)  500 mg  BID


 PO  1/10/18 21:00


 1/20/18 20:59  1/11/18 08:17


500 MG


 


 Oxycodone HCl


  (Roxicodone


 Immediate Rel Tab)  5 mg  DAILY  PRN


 PO  1/10/18 15:15


 1/24/18 15:14   


 











Assessment and Plan


Assessment and Plan:


Pt is a 36 yo F who presents as a direct admit for failure of outpatient SLE 

flare up treatment with 40mg prednisone. Her most recent UA notable for 

proteinuria (6 g). 





SLE flare 


- Failed outpatient treatment 


- Dr. Cage, her outpatient nephrologist, is aware


- Prednisone switched to IV solumedrol 1g for three days, then transition to 

oral prednisone


- Pantoprazole for GI prophylaxis


- Rheum consulted: pt most agreeable to rituximab for maintenance, would like 

to do more research on it. 





Proteinuria


- Rheum consulted: consider outpatient renal biopsy. 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 

lupus nephritis


- Monitor proteinuria with UA outpatient





Depression


- Stopped Prozac due to headache as side effect


- Monitor





Acne


- Continue keflex





Seizure-like activity?


- Undocumented, will monitor





VTE Prophylaxis


- Encourage ambulation





Code


- Full





Disposition


- Home

## 2018-01-11 NOTE — NEPHROLOGY CONSULTATION
DATE OF CONSULTATION:  01/11/2018

 

ATTENDING OF RECORD:  Willa Lizama MD.

 

REASON FOR CONSULTATION:  Lupus flare.

 

HISTORY OF PRESENT ILLNESS:  This is a 37-year-old female who follows with me

for lupus who is not on chronic immunosuppressive therapy.  The patient in

the past when she gets flares would respond favorably to steroids.  The

patient was on prednisone 40 mg a day for the past month and was starting to

feel better, energy levels were improving.  The patient also recently had an

infectious acne that responded favorably to Keflex.  The patient was getting

her routine monthly labs with the hopes that if things were stable to slowly

wean down the prednisone 40 mg a day to 30 mg a day.  Unfortunately, the

patient's protein went from under a gram to 6 grams of protein.  The patient

was brought in for IV steroids.  The patient did receive 1 dose yesterday and

responded favorably to it.  The patient is resting comfortably.

 

PAST MEDICAL HISTORY:  Lupus, history of seizures, depression.

 

PAST SURGICAL HISTORY:  Denies.

 

FAMILY HISTORY:  No renal disease in family.

 

SOCIAL HISTORY:  Remote tobacco.  No alcohol, no drugs.  Lives with family.

 

CURRENT MEDICATIONS:  Solu-Medrol 1 gram IV daily, Protonix 40 mg daily,

Lovenox 40 mg subQ daily, Keflex 500 mg p.o. b.i.d.

 

REVIEW OF SYSTEMS:  Positive acne.  Positive fatigue which was improving. 

Positive rash on face.  Positive diffuse joint pains.  No chest pain, no

shortness of breath.  No nausea, vomiting.  No dysphagia.  No blurry vision. 

All other review of systems otherwise negative.

 

PHYSICAL EXAMINATION:

VITAL SIGNS:  Temperature 37, pulse 67, respiratory rate is 20, blood

pressure is 115/77, satting 98% on room air.

GENERAL:  Awake, alert, oriented x3.

EYES:  No scleral icterus.

ENT:  Moist mucous membranes with diffuse rash on the face.

NECK:  Supple.

PULMONARY:  Clear to auscultation.

CARDIAC:  Regular rate and rhythm.

ABDOMEN:  Bowel sounds positive, soft, nontender.

EXTREMITIES:  No clubbing, cyanosis or edema.

NEUROLOGICALLY:  Nonfocal.

DERMATOLOGIC:  Rash on face.

 

LABORATORIES:  INR is 1.  Sodium is 140, potassium 3.7, chloride is 106,

bicarbonate is 30, BUN 17, creatinine 0.81, glucose 88, calcium is 8.7. 

White count 7.3, H&H 12 and 35.  Platelet count is 207.

 

IMPRESSION AND PLAN:  Lupus nephritis, patient with nephrotic range

proteinuria; however, no significant edema or volume overload or hypertension

from this, does have blood in the urine as well.  The patient prefers not to

do Cytoxan or cellcept, but was agreeable to IV steroids and received 1 gram of

Solu-Medrol yesterday and will receive another dose today.  If we can arrange

a third dose as an outpatient tomorrow at the [facility], okay from my perspective to

go home today and after the third dose of Solu-Medrol, start taking 60 mg of

prednisone a day as opposed to 40 mg and recheck lab work next week to see if

labs are improving.  If labs do improve, we will do a slow taper.  If labs

are not improving, we will discuss other more aggressive immunosuppressive

therapies, rituximab was mentioned, however, patient needs to do her research

on rituximab before agreeing to such medication.  Patient also 

hesitant to do a renal biopsy and if not agreeable to more agressive therapies, 

may not necessarily be warranted. At this point does not want

CellCept secondary to side effects and is currently not agreeable to Cytoxan.

 For now, we will continue the steroids and hope that she responds favorably

to the steroids.  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

 

I appreciate the consultation.

 

 

 

ANNAMARIE

## 2018-01-11 NOTE — DISCHARGE INSTRUCTIONS
Discharge Instructions


Date of Service


Jan 11, 2018.





Admission


Reason for Admission:  Proteinuria, Sle Exacerbation





Discharge


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 

glomerulonephritis





Discharge Goals


Goal(s):  Improve disease control





Activity Recommendations


Activity Limitations:  resume your previous activity





.





Instructions / Follow-Up


Instructions / Follow-Up


Follow up with Dr. Cage in one week





Please come to [facility] (medical treatment unit) to get your third dose of IV steroid





Current Hospital Diet


Patient's current hospital diet: Regular Diet





Discharge Diet


Recommended Diet:  Regular Diet





Pending Studies


Studies pending at discharge:  no





Medical Emergencies








.


Who to Call and When:





Medical Emergencies:  If at any time you feel your situation is an emergency, 

please call 911 immediately.





.





Non-Emergent Contact


Non-Emergency issues call your:  Primary Care Provider





.


.








"Provider Documentation" section prepared by Willa Lizama.








.





VTE Core Measure


Inpt VTE Proph given/why not?:  Enoxaparin (Lovenox)SQ

## 2018-04-25 ENCOUNTER — HOSPITAL ENCOUNTER (OUTPATIENT)
Dept: HOSPITAL 45 - C.ULTR | Age: 38
Discharge: HOME | End: 2018-04-25
Attending: FAMILY MEDICINE
Payer: COMMERCIAL

## 2018-04-25 DIAGNOSIS — N80.9: Primary | ICD-10-CM

## 2018-04-25 DIAGNOSIS — Z97.5: ICD-10-CM

## 2018-04-25 DIAGNOSIS — N83.201: ICD-10-CM

## 2018-04-25 NOTE — DIAGNOSTIC IMAGING REPORT
EXAMINATION: PELVIC ULTRASOUND



CLINICAL HISTORY: N80.9 PELVIC PAIN, HISTORY OF ENDOMETRIOSIS.



COMPARISON STUDY:  5/23/2010



FINDINGS: 

The uterus measured 6.2 x 3.2 x 3.9 cm.

The endometrial stripe measured 2 mm. An IUD is visualized..

The right ovary measured 39 x 25 x 35 mm. There is a 27 mm right ovarian cyst

containing a tiny daughter cyst..

The left ovary measured 15 x 12 x 17 mm..

There is no ultrasonographic evidence of ovarian torsion. It should be noted

that ovarian torsion can be present with normal Doppler ultrasonographic

findings.

There was no evidence of pathologic free pelvic fluid.



IMPRESSION:  

1. Indwelling IUD. Otherwise normal uterus.

2. 27 mm right ovarian cyst likely functional







Electronically signed by:  Tej Steven M.D.

4/25/2018 1:05 PM



Dictated Date/Time:  4/25/2018 1:03 PM